# Patient Record
Sex: FEMALE
[De-identification: names, ages, dates, MRNs, and addresses within clinical notes are randomized per-mention and may not be internally consistent; named-entity substitution may affect disease eponyms.]

---

## 2018-11-28 ENCOUNTER — HOSPITAL ENCOUNTER (INPATIENT)
Dept: HOSPITAL 14 - H.EROB2 | Age: 23
LOS: 2 days | Discharge: HOME | End: 2018-11-30
Attending: SPECIALIST | Admitting: SPECIALIST
Payer: COMMERCIAL

## 2018-11-28 VITALS — BODY MASS INDEX: 36.3 KG/M2

## 2018-11-28 DIAGNOSIS — N85.8: ICD-10-CM

## 2018-11-28 DIAGNOSIS — Z3A.37: ICD-10-CM

## 2018-11-28 DIAGNOSIS — O34.211: Primary | ICD-10-CM

## 2018-11-28 LAB
BASOPHILS # BLD AUTO: 0 K/UL (ref 0–0.2)
BASOPHILS NFR BLD: 0.3 % (ref 0–2)
EOSINOPHIL # BLD AUTO: 0 K/UL (ref 0–0.7)
EOSINOPHIL NFR BLD: 0.4 % (ref 0–4)
ERYTHROCYTE [DISTWIDTH] IN BLOOD BY AUTOMATED COUNT: 14.8 % (ref 11.5–14.5)
HGB BLD-MCNC: 13.1 G/DL (ref 12–16)
LYMPHOCYTES # BLD AUTO: 2.2 K/UL (ref 1–4.3)
LYMPHOCYTES NFR BLD AUTO: 23.7 % (ref 20–40)
MCH RBC QN AUTO: 29.2 PG (ref 27–31)
MCHC RBC AUTO-ENTMCNC: 33.2 G/DL (ref 33–37)
MCV RBC AUTO: 88 FL (ref 81–99)
MONOCYTES # BLD: 0.5 K/UL (ref 0–0.8)
MONOCYTES NFR BLD: 5.1 % (ref 0–10)
NEUTROPHILS # BLD: 6.4 K/UL (ref 1.8–7)
NEUTROPHILS NFR BLD AUTO: 70.5 % (ref 50–75)
NRBC BLD AUTO-RTO: 0.2 % (ref 0–0)
PLATELET # BLD: 192 K/UL (ref 130–400)
PMV BLD AUTO: 10.6 FL (ref 7.2–11.7)
RBC # BLD AUTO: 4.48 MIL/UL (ref 3.8–5.2)
WBC # BLD AUTO: 9.1 K/UL (ref 4.8–10.8)

## 2018-11-28 PROCEDURE — 4A1HXCZ MONITORING OF PRODUCTS OF CONCEPTION, CARDIAC RATE, EXTERNAL APPROACH: ICD-10-PCS | Performed by: SPECIALIST

## 2018-11-28 NOTE — OBADHP
===========================

Datetime: 2018 17:30

===========================

   

IP Chief Complaint Other:  previous C/S

IP Adm Impression Other:  previous C/S in labor

Admit Comment, IP Provider:  Declined BTL; admit for repeat C/S

Extremities - PN:  Normal

Abdomen - PN:  Abnormal

Breast - PN:  Not Done

Lungs - PN:  Normal

Heart - PN:  Normal

Thyroid - PN:  Normal

Neurologic - PN:  Normal

HEENT - PN:  Normal

General - PN:  Normal

FHR - Baseline A Provider:  150

Membranes, Provider:  Intact

Contraction Comments Provider:  2-5 mins

Comments, ACOG Physical Exam:  abd gravid fundus at term NT Old pfanesteal scar with keloid Ext no ca
lf tenderness

Gestation - Est Wks by US:  37 +

Pool Provider:  Negative

IP Prenatal Hx Assessment:  The Prenatal History has been Reviewed and is Current

Vital Signs Provider:  Reviewed

IP Chief Complaint:  Uterine contractions; Other

NICHD Variability Prov Fetus A:  Moderate 6-25bpm

NICHD Decel Fetus A IP Provider:  None

Dilatation, Provider:  1cm

Effacement, Provider:  50

Genitourinary Exam:  Normal

DTRs - PN:  Normal

EGA AdmitDate IP:  37.2

IP Adm Impression:  Term, intrauterine pregnancy

IP Admit Plan:  Admit to unit; Initiate  Section protocol

## 2018-11-28 NOTE — OBDS
===================================

DELIVERY PERSONNEL

===================================

   

Nurse Midwife Certified:  

Delivery Doctor:  DELIO Leiva MD (Annotations: Data stored by [facility] on behalf of user)

Scrub Nurse:  DEANNA Ortiz

Circulator:  Radah Cuevas/JERICA Akins RN

Anesthesiologist:  DELIO Ramírez MD (Annotations: Data stored by [facility] on behalf of user)

Anesthetist:  osvaldo

Resident:  DR [email or web address].Fellow

   

===================================

MATERNAL INFORMATION

===================================

   

Delivery Anesthesia:  Spinal

Medications in Delivery:  mefoxin, pitocin

Estimated  Blood Loss (ml):  850

Maternal Complications:  None

Provider Comments:  see Dictated surgeons note

   

===================================

LABOR SUMMARY

===================================

   

EDC:  2018 00:00

No. Babies in Womb:  1

 Attempted:  No

Labor Anesthesia:  Intrathecal

   

===================================

LABOR INFORMATION

===================================

   

Reason for Induction:  Not Applicable

Onset of Labor:  2018 10:00

Oxytocin:  N/A

Group B Beta Strep:  Negative

Antibiotics # of Doses:  0

Antibiotics Time of Last Dose:  n/a

Steroids Given:  None

Reason Steroids Not Administered:  Not Applicable

   

===================================

MEMBRANES

===================================

   

Membranes Rupture Method:  Spontaneous

Rupture of Membranes:  2018 18:55

Length of Rupture (hrs):  0.00

Amniotic Fluid Color:  Bloody

Amniotic Fluid Amount:  Moderate

Amniotic Fluid Odor:  Normal

   

===================================

STAGES OF LABOR

===================================

   

Stage 3 hrs:  0

Stage 3 min:  1

Total Time in Labor hrs:  8

Total Time in Labor min:  56

   

===================================

VAGINAL DELIVERY

===================================

   

Episiotomy:  None

Laceration Extension:  N/A

Laceration Type:  None

Laceration Repair:  Not Applicable

Sponge Count Correct:  Yes

Sharps Count Correct:  Yes

Count Comment:  count correct x3

   

===================================

CSECTION DELIVERY

===================================

   

Primary Indication:  Other

Other Primary Indication:  Repeat Csection

Secondary Indication:  Other

CSection Urgency:  Non Elective

CSection Incidence:  Repeat

Labor:  Labor

Elective:  Nonelective

CSection Incision:  Lower Uterine Transverse

Uterine Closure:  Double-layer closure

   

===================================

BABY A INFORMATION

===================================

   

Infant Delivery Date/Time:  2018 18:55

Method of Delivery:  

Born in Route :  No

:  N/A

Forceps:  N/A

Vacuum Extraction:  N/A

Shoulder Dystocia :  No

   

===================================

SHOULDER DYSTOCIA BABY A

===================================

   

Infant Delivery Date/Time:  2018 18:55

   

===================================

PRESENTATION/POSITION BABY A

===================================

   

Presentation:  Cephalic

Cephalic Presentation:  Vertex

Breech Presentation:  N/A

   

===================================

PLACENTA INFORMATION BABY A

===================================

   

Placenta Delivery Time :  2018 18:56

Placenta Method of Delivery:  Spontaneous

Placenta Status:  Delivered

   

===================================

APGAR SCORES BABY A

===================================

   

Heart Rate 1 min:  >100 bpm

Resp Effort 1 min:  Good Cry

Reflex Irritability 1 min:  Cough or Sneeze or Pulls Away

Muscle Tone 1 min:  Active Motion

Color 1 min:  Body Pink, Extremities Blue

Resuscitation Effort 1 min:  Tactile Stimulation

APGAR SCORE 1 MIN:  9

Heart Rate 5 min:  >100 bpm

Resp Effort 5 min:  Good Cry

Reflex Irritability 5 min:  Cough or Sneeze or Pulls Away

Muscle Tone 5 min:  Active Motion

Color 5 min:  Body Pink, Extremities Blue

Resuscitation Effort 5 min:  N/A

APGAR SCORE 5 MIN:  9

   

===================================

INFANT INFORMATION BABY A

===================================

   

Gestational Age at Delivery:  37.2

Gestational Status:  Term

Infant Outcome :  Liveborn

Infant Condition :  Stable

Infant Sex:  Female

   

===================================

IDENTIFICATION/MEDS BABY A

===================================

   

ID Band Number:  [id number]

ID Band Location:  Left Leg; Left Arm



   

===================================

WEIGHT/LENGTH BABY A

===================================

   

Infant Birthweight (gms):  3380

Infant Weight (lb):  7

Infant Weight (oz):  7

   

===================================

CORD INFORMATION BABY A

===================================

   

No. Cord Vessels:  3

Nuchal Cord :  N/A

Nuchal Cord Other:  na

True Knot:  na

Infant Cord pH Baby Arterial:  na

Infant Cord pH Baby Venous:  na

Cord Blood Taken:  Yes

Banking/Donate Info:  na

Infant Suction:  Mouth

## 2018-11-29 VITALS — RESPIRATION RATE: 18 BRPM

## 2018-11-29 LAB
ERYTHROCYTE [DISTWIDTH] IN BLOOD BY AUTOMATED COUNT: 14.6 % (ref 11.5–14.5)
HGB BLD-MCNC: 12.8 G/DL (ref 12–16)
MCH RBC QN AUTO: 29.3 PG (ref 27–31)
MCHC RBC AUTO-ENTMCNC: 33.7 G/DL (ref 33–37)
MCV RBC AUTO: 87.1 FL (ref 81–99)
PLATELET # BLD: 171 K/UL (ref 130–400)
RBC # BLD AUTO: 4.36 MIL/UL (ref 3.8–5.2)
WBC # BLD AUTO: 10.8 K/UL (ref 4.8–10.8)

## 2018-11-29 RX ADMIN — CEFOXITIN SODIUM SCH MLS/HR: 1 INJECTION, SOLUTION INTRAVENOUS at 02:55

## 2018-11-29 RX ADMIN — CEFOXITIN SODIUM SCH MLS/HR: 1 INJECTION, SOLUTION INTRAVENOUS at 18:41

## 2018-11-29 RX ADMIN — OXYCODONE HYDROCHLORIDE AND ACETAMINOPHEN PRN TAB: 5; 325 TABLET ORAL at 11:08

## 2018-11-29 RX ADMIN — CEFOXITIN SODIUM SCH MLS/HR: 1 INJECTION, SOLUTION INTRAVENOUS at 10:35

## 2018-11-29 RX ADMIN — OXYCODONE HYDROCHLORIDE AND ACETAMINOPHEN PRN TAB: 5; 325 TABLET ORAL at 21:47

## 2018-11-29 RX ADMIN — SIMETHICONE CHEW TAB 80 MG PRN MG: 80 TABLET ORAL at 21:43

## 2018-11-29 NOTE — OBPPN
===========================

Datetime: 11/29/2018 07:32

===========================

   

PP Pain Prov:  Within normal limits

PP Pain Prov comment:  No SOB chest or leg pains

PP Nausea Prov:  Denies

PP BM Prov:  No

PP Breasts Prov:  Normal

PP Heart Prov:  Normal

PP Lungs Prov:  Normal

PP Abdomen/Uterus Prov:  Abnormal

PP Lochia Prov:  Normal

PP Vulva/Perineum Prov:  Normal

PP CVA Tenderness Prov:  Normal

PP Extremities Prov:  Normal

PP C/S Incision Prov:  Normal

PP Breastfeeding Progress Prov:  Normal

PP Comments Phys Exam Prov:  breast not engorged;

   Abd soft not distended depressible fundus firm at umb; Dressing intact no sign of active bleeding;


   ext no calf tenderness

PP Impression Prov:  Normal postpartum progression

PP Plan Prov:  Continue present management

PP Progress Note Prov:  cbc stable, OOB to chair with help Increase diet as tolerated 

   Pt desires flu vaccine

IP PP Procedures:  None

Vital Signs Provider PP:  Reviewed

## 2018-11-30 RX ADMIN — OXYCODONE HYDROCHLORIDE AND ACETAMINOPHEN PRN TAB: 5; 325 TABLET ORAL at 04:06

## 2018-11-30 RX ADMIN — OXYCODONE HYDROCHLORIDE AND ACETAMINOPHEN PRN TAB: 5; 325 TABLET ORAL at 08:33

## 2018-11-30 RX ADMIN — SIMETHICONE CHEW TAB 80 MG PRN MG: 80 TABLET ORAL at 04:07

## 2018-11-30 RX ADMIN — OXYCODONE HYDROCHLORIDE AND ACETAMINOPHEN PRN TAB: 5; 325 TABLET ORAL at 13:00

## 2018-11-30 NOTE — OBPPN
===========================

Datetime: 11/30/2018 08:05

===========================

   

PP Pain Prov:  Within normal limits

PP Pain Prov comment:  Denies SOB, chest or leg pains

PP Nausea Prov:  Denies

PP Flatus Prov:  Yes

PP BM Prov:  No

PP Breasts Prov:  Normal

PP Lungs Prov:  Normal

PP Abdomen/Uterus Prov:  Abnormal

PP Lochia Prov:  Normal

PP Vulva/Perineum Prov:  Normal

PP CVA Tenderness Prov:  Normal

PP Extremities Prov:  Normal

PP C/S Incision Prov:  Normal

PP Breastfeeding Progress Prov:  Normal

PP Comments Phys Exam Prov:  breast NE, NT; Abd soft ND, depressible, fundus firm at umb.  Dressing r
emoved no active bleeding Prolene in place Ext no calf tenderness

PP Impression Prov:  Normal postpartum progression

PP Plan Prov:  Continue present management

PP Progress Note Prov:  OOB and ambulation Dulcolax suppt this am

IP PP Procedures:  None

Vital Signs Provider PP:  Reviewed

## 2018-11-30 NOTE — OBDCSUM
===========================

Datetime: 11/30/2018 16:01

===========================

   

Discharged to, Provider:  Home

Follow up at, Provider:  Dr Leiva

Disch Instr Activity:  Bedrest; May be up to bathroom; May be up for meals; May Shower

Disch Instr Diet:  Regular

Discharge Instructions, Provider:  Routine instructions given

Discharge Diagnosis, Provider:  Term Pregnancy Delivered

Discharge Time:  11/30/2018 16:01

Follow up in weeks, Provider:  1 wk

Disch Referrals:  None

Contraception discussed, Prov:  Yes

Disch Activity Restrictions:  No exercising; No lifting; No driving; Minimize walking; Minimize stair
-climbing; No sexual activity; Nothing in vagina - [place], tampons, douche

Discharge Comment, Provider:  rx for percocet given and instructions given

Contraception after Delivery:  Undecided

## 2018-12-01 VITALS
HEART RATE: 97 BPM | TEMPERATURE: 98.2 F | DIASTOLIC BLOOD PRESSURE: 74 MMHG | SYSTOLIC BLOOD PRESSURE: 119 MMHG | OXYGEN SATURATION: 99 %

## 2019-01-01 NOTE — OBPPN
===========================

Datetime: 11/30/2018 15:57

===========================

   

PP Pain Prov:  Within normal limits

PP Pain Prov comment:  No SOB chest or leg pains

PP Nausea Prov:  Denies

PP Flatus Prov:  Yes

PP BM Prov:  Yes

PP Breasts Prov:  Not Done

PP Lungs Prov:  Normal

PP Abdomen/Uterus Prov:  Abnormal

PP Lochia Prov:  Normal

PP Vulva/Perineum Prov:  Normal

PP CVA Tenderness Prov:  Normal

PP Extremities Prov:  Normal

PP C/S Incision Prov:  Normal

PP Breastfeeding Progress Prov:  Normal

PP Comments Phys Exam Prov:  no change from this am exam

PP Impression Prov:  Normal postpartum progression

PP Plan Prov:  Discharge

PP Progress Note Prov:  pt wants to go home today, had bm and feels good Baby cleared by peds and raymundo
l D/C home with instructions and folllow up office 1 wk

Vital Signs Provider PP:  Reviewed 1